# Patient Record
Sex: FEMALE | Race: ASIAN | NOT HISPANIC OR LATINO | URBAN - METROPOLITAN AREA
[De-identification: names, ages, dates, MRNs, and addresses within clinical notes are randomized per-mention and may not be internally consistent; named-entity substitution may affect disease eponyms.]

---

## 2018-08-18 ENCOUNTER — OUTPATIENT (OUTPATIENT)
Dept: OUTPATIENT SERVICES | Facility: HOSPITAL | Age: 38
LOS: 1 days | End: 2018-08-18
Payer: MEDICAID

## 2018-08-18 DIAGNOSIS — O26.899 OTHER SPECIFIED PREGNANCY RELATED CONDITIONS, UNSPECIFIED TRIMESTER: ICD-10-CM

## 2018-08-18 DIAGNOSIS — Z3A.00 WEEKS OF GESTATION OF PREGNANCY NOT SPECIFIED: ICD-10-CM

## 2018-08-18 PROCEDURE — 59025 FETAL NON-STRESS TEST: CPT | Mod: 26

## 2018-08-20 ENCOUNTER — INPATIENT (INPATIENT)
Facility: HOSPITAL | Age: 38
LOS: 2 days | Discharge: HOME | End: 2018-08-23
Attending: SPECIALIST | Admitting: SPECIALIST
Payer: MEDICAID

## 2018-08-20 VITALS
SYSTOLIC BLOOD PRESSURE: 116 MMHG | RESPIRATION RATE: 26 BRPM | DIASTOLIC BLOOD PRESSURE: 49 MMHG | HEART RATE: 117 BPM | OXYGEN SATURATION: 98 %

## 2018-08-20 DIAGNOSIS — O26.90 PREGNANCY RELATED CONDITIONS, UNSPECIFIED, UNSPECIFIED TRIMESTER: ICD-10-CM

## 2018-08-20 DIAGNOSIS — Z3A.00 WEEKS OF GESTATION OF PREGNANCY NOT SPECIFIED: ICD-10-CM

## 2018-08-20 LAB
BASOPHILS # BLD AUTO: 0.02 K/UL — SIGNIFICANT CHANGE UP (ref 0–0.2)
BASOPHILS NFR BLD AUTO: 0.3 % — SIGNIFICANT CHANGE UP (ref 0–2)
BLD GP AB SCN SERPL QL: NEGATIVE — SIGNIFICANT CHANGE UP
EOSINOPHIL # BLD AUTO: 0.15 K/UL — SIGNIFICANT CHANGE UP (ref 0–0.5)
EOSINOPHIL NFR BLD AUTO: 2.1 % — SIGNIFICANT CHANGE UP (ref 0–6)
HCT VFR BLD CALC: 27 % — LOW (ref 34.5–45)
HCT VFR BLD CALC: 35.4 % — SIGNIFICANT CHANGE UP (ref 34.5–45)
HGB BLD-MCNC: 10.5 G/DL — LOW (ref 11.5–15.5)
HGB BLD-MCNC: 8.1 G/DL — LOW (ref 11.5–15.5)
IMM GRANULOCYTES # BLD AUTO: 0.04 # — SIGNIFICANT CHANGE UP
IMM GRANULOCYTES NFR BLD AUTO: 0.6 % — SIGNIFICANT CHANGE UP (ref 0–1.5)
LYMPHOCYTES # BLD AUTO: 1.91 K/UL — SIGNIFICANT CHANGE UP (ref 1–3.3)
LYMPHOCYTES # BLD AUTO: 26.8 % — SIGNIFICANT CHANGE UP (ref 13–44)
MCHC RBC-ENTMCNC: 25.1 PG — LOW (ref 27–34)
MCHC RBC-ENTMCNC: 25.2 PG — LOW (ref 27–34)
MCHC RBC-ENTMCNC: 29.7 % — LOW (ref 32–36)
MCHC RBC-ENTMCNC: 30 % — LOW (ref 32–36)
MCV RBC AUTO: 83.6 FL — SIGNIFICANT CHANGE UP (ref 80–100)
MCV RBC AUTO: 85.1 FL — SIGNIFICANT CHANGE UP (ref 80–100)
MONOCYTES # BLD AUTO: 0.68 K/UL — SIGNIFICANT CHANGE UP (ref 0–0.9)
MONOCYTES NFR BLD AUTO: 9.6 % — SIGNIFICANT CHANGE UP (ref 2–14)
NEUTROPHILS # BLD AUTO: 4.32 K/UL — SIGNIFICANT CHANGE UP (ref 1.8–7.4)
NEUTROPHILS NFR BLD AUTO: 60.6 % — SIGNIFICANT CHANGE UP (ref 43–77)
NRBC # FLD: 0 — SIGNIFICANT CHANGE UP
NRBC # FLD: 0 — SIGNIFICANT CHANGE UP
PLATELET # BLD AUTO: 192 K/UL — SIGNIFICANT CHANGE UP (ref 150–400)
PLATELET # BLD AUTO: 211 K/UL — SIGNIFICANT CHANGE UP (ref 150–400)
PMV BLD: 11.7 FL — SIGNIFICANT CHANGE UP (ref 7–13)
PMV BLD: SIGNIFICANT CHANGE UP FL (ref 7–13)
RBC # BLD: 3.23 M/UL — LOW (ref 3.8–5.2)
RBC # BLD: 4.16 M/UL — SIGNIFICANT CHANGE UP (ref 3.8–5.2)
RBC # FLD: 22.1 % — HIGH (ref 10.3–14.5)
RBC # FLD: 22.7 % — HIGH (ref 10.3–14.5)
RH IG SCN BLD-IMP: POSITIVE — SIGNIFICANT CHANGE UP
RH IG SCN BLD-IMP: POSITIVE — SIGNIFICANT CHANGE UP
T PALLIDUM AB TITR SER: NEGATIVE — SIGNIFICANT CHANGE UP
WBC # BLD: 12.08 K/UL — HIGH (ref 3.8–10.5)
WBC # BLD: 7.12 K/UL — SIGNIFICANT CHANGE UP (ref 3.8–10.5)
WBC # FLD AUTO: 12.08 K/UL — HIGH (ref 3.8–10.5)
WBC # FLD AUTO: 7.12 K/UL — SIGNIFICANT CHANGE UP (ref 3.8–10.5)

## 2018-08-20 RX ORDER — INFLUENZA VIRUS VACCINE 15; 15; 15; 15 UG/.5ML; UG/.5ML; UG/.5ML; UG/.5ML
0.5 SUSPENSION INTRAMUSCULAR ONCE
Qty: 0 | Refills: 0 | Status: DISCONTINUED | OUTPATIENT
Start: 2018-08-20 | End: 2018-08-21

## 2018-08-20 RX ORDER — DIPHENHYDRAMINE HCL 50 MG
25 CAPSULE ORAL EVERY 6 HOURS
Qty: 0 | Refills: 0 | Status: DISCONTINUED | OUTPATIENT
Start: 2018-08-20 | End: 2018-08-23

## 2018-08-20 RX ORDER — FERROUS SULFATE 325(65) MG
325 TABLET ORAL
Qty: 0 | Refills: 0 | Status: DISCONTINUED | OUTPATIENT
Start: 2018-08-20 | End: 2018-08-21

## 2018-08-20 RX ORDER — METOCLOPRAMIDE HCL 10 MG
10 TABLET ORAL ONCE
Qty: 0 | Refills: 0 | Status: DISCONTINUED | OUTPATIENT
Start: 2018-08-20 | End: 2018-08-20

## 2018-08-20 RX ORDER — ACETAMINOPHEN 500 MG
1000 TABLET ORAL ONCE
Qty: 0 | Refills: 0 | Status: DISCONTINUED | OUTPATIENT
Start: 2018-08-20 | End: 2018-08-21

## 2018-08-20 RX ORDER — GLYCERIN ADULT
1 SUPPOSITORY, RECTAL RECTAL AT BEDTIME
Qty: 0 | Refills: 0 | Status: DISCONTINUED | OUTPATIENT
Start: 2018-08-20 | End: 2018-08-23

## 2018-08-20 RX ORDER — LANOLIN
1 OINTMENT (GRAM) TOPICAL
Qty: 0 | Refills: 0 | Status: DISCONTINUED | OUTPATIENT
Start: 2018-08-20 | End: 2018-08-23

## 2018-08-20 RX ORDER — SODIUM CHLORIDE 9 MG/ML
1000 INJECTION, SOLUTION INTRAVENOUS
Qty: 0 | Refills: 0 | Status: DISCONTINUED | OUTPATIENT
Start: 2018-08-20 | End: 2018-08-20

## 2018-08-20 RX ORDER — IBUPROFEN 200 MG
600 TABLET ORAL EVERY 6 HOURS
Qty: 0 | Refills: 0 | Status: DISCONTINUED | OUTPATIENT
Start: 2018-08-20 | End: 2018-08-21

## 2018-08-20 RX ORDER — HYDROMORPHONE HYDROCHLORIDE 2 MG/ML
30 INJECTION INTRAMUSCULAR; INTRAVENOUS; SUBCUTANEOUS
Qty: 0 | Refills: 0 | Status: DISCONTINUED | OUTPATIENT
Start: 2018-08-20 | End: 2018-08-21

## 2018-08-20 RX ORDER — ACETAMINOPHEN 500 MG
975 TABLET ORAL EVERY 6 HOURS
Qty: 0 | Refills: 0 | Status: DISCONTINUED | OUTPATIENT
Start: 2018-08-20 | End: 2018-08-21

## 2018-08-20 RX ORDER — HEPARIN SODIUM 5000 [USP'U]/ML
5000 INJECTION INTRAVENOUS; SUBCUTANEOUS EVERY 12 HOURS
Qty: 0 | Refills: 0 | Status: DISCONTINUED | OUTPATIENT
Start: 2018-08-20 | End: 2018-08-23

## 2018-08-20 RX ORDER — OXYTOCIN 10 UNIT/ML
333.33 VIAL (ML) INJECTION
Qty: 20 | Refills: 0 | Status: COMPLETED | OUTPATIENT
Start: 2018-08-20

## 2018-08-20 RX ORDER — SODIUM CHLORIDE 9 MG/ML
500 INJECTION, SOLUTION INTRAVENOUS ONCE
Qty: 0 | Refills: 0 | Status: COMPLETED | OUTPATIENT
Start: 2018-08-20 | End: 2018-08-20

## 2018-08-20 RX ORDER — SODIUM CHLORIDE 9 MG/ML
1000 INJECTION, SOLUTION INTRAVENOUS
Qty: 0 | Refills: 0 | Status: DISCONTINUED | OUTPATIENT
Start: 2018-08-20 | End: 2018-08-22

## 2018-08-20 RX ORDER — DOCUSATE SODIUM 100 MG
100 CAPSULE ORAL
Qty: 0 | Refills: 0 | Status: DISCONTINUED | OUTPATIENT
Start: 2018-08-20 | End: 2018-08-21

## 2018-08-20 RX ORDER — HYDROMORPHONE HYDROCHLORIDE 2 MG/ML
1 INJECTION INTRAMUSCULAR; INTRAVENOUS; SUBCUTANEOUS
Qty: 0 | Refills: 0 | Status: DISCONTINUED | OUTPATIENT
Start: 2018-08-20 | End: 2018-08-20

## 2018-08-20 RX ORDER — HYDROMORPHONE HYDROCHLORIDE 2 MG/ML
0.5 INJECTION INTRAMUSCULAR; INTRAVENOUS; SUBCUTANEOUS ONCE
Qty: 0 | Refills: 0 | Status: DISCONTINUED | OUTPATIENT
Start: 2018-08-20 | End: 2018-08-20

## 2018-08-20 RX ORDER — SODIUM CHLORIDE 9 MG/ML
1000 INJECTION, SOLUTION INTRAVENOUS ONCE
Qty: 0 | Refills: 0 | Status: COMPLETED | OUTPATIENT
Start: 2018-08-20 | End: 2018-08-20

## 2018-08-20 RX ORDER — OXYTOCIN 10 UNIT/ML
41.67 VIAL (ML) INJECTION
Qty: 20 | Refills: 0 | Status: DISCONTINUED | OUTPATIENT
Start: 2018-08-20 | End: 2018-08-21

## 2018-08-20 RX ORDER — SIMETHICONE 80 MG/1
80 TABLET, CHEWABLE ORAL
Qty: 0 | Refills: 0 | Status: DISCONTINUED | OUTPATIENT
Start: 2018-08-20 | End: 2018-08-23

## 2018-08-20 RX ORDER — OXYCODONE HYDROCHLORIDE 5 MG/1
5 TABLET ORAL EVERY 4 HOURS
Qty: 0 | Refills: 0 | Status: DISCONTINUED | OUTPATIENT
Start: 2018-08-20 | End: 2018-08-21

## 2018-08-20 RX ORDER — LEVOTHYROXINE SODIUM 125 MCG
75 TABLET ORAL DAILY
Qty: 0 | Refills: 0 | Status: DISCONTINUED | OUTPATIENT
Start: 2018-08-20 | End: 2018-08-23

## 2018-08-20 RX ORDER — ONDANSETRON 8 MG/1
4 TABLET, FILM COATED ORAL EVERY 6 HOURS
Qty: 0 | Refills: 0 | Status: DISCONTINUED | OUTPATIENT
Start: 2018-08-20 | End: 2018-08-21

## 2018-08-20 RX ORDER — NALOXONE HYDROCHLORIDE 4 MG/.1ML
0.1 SPRAY NASAL
Qty: 0 | Refills: 0 | Status: DISCONTINUED | OUTPATIENT
Start: 2018-08-20 | End: 2018-08-21

## 2018-08-20 RX ORDER — OXYCODONE HYDROCHLORIDE 5 MG/1
5 TABLET ORAL
Qty: 0 | Refills: 0 | Status: DISCONTINUED | OUTPATIENT
Start: 2018-08-20 | End: 2018-08-21

## 2018-08-20 RX ORDER — OXYTOCIN 10 UNIT/ML
333.33 VIAL (ML) INJECTION
Qty: 20 | Refills: 0 | Status: DISCONTINUED | OUTPATIENT
Start: 2018-08-20 | End: 2018-08-20

## 2018-08-20 RX ORDER — KETOROLAC TROMETHAMINE 30 MG/ML
30 SYRINGE (ML) INJECTION EVERY 6 HOURS
Qty: 0 | Refills: 0 | Status: DISCONTINUED | OUTPATIENT
Start: 2018-08-20 | End: 2018-08-21

## 2018-08-20 RX ORDER — HYDROMORPHONE HYDROCHLORIDE 2 MG/ML
0.5 INJECTION INTRAMUSCULAR; INTRAVENOUS; SUBCUTANEOUS
Qty: 0 | Refills: 0 | Status: DISCONTINUED | OUTPATIENT
Start: 2018-08-20 | End: 2018-08-21

## 2018-08-20 RX ORDER — FENTANYL CITRATE 50 UG/ML
50 INJECTION INTRAVENOUS ONCE
Qty: 0 | Refills: 0 | Status: DISCONTINUED | OUTPATIENT
Start: 2018-08-20 | End: 2018-08-20

## 2018-08-20 RX ORDER — FENTANYL CITRATE 50 UG/ML
100 INJECTION INTRAVENOUS ONCE
Qty: 0 | Refills: 0 | Status: DISCONTINUED | OUTPATIENT
Start: 2018-08-20 | End: 2018-08-20

## 2018-08-20 RX ORDER — TETANUS TOXOID, REDUCED DIPHTHERIA TOXOID AND ACELLULAR PERTUSSIS VACCINE, ADSORBED 5; 2.5; 8; 8; 2.5 [IU]/.5ML; [IU]/.5ML; UG/.5ML; UG/.5ML; UG/.5ML
0.5 SUSPENSION INTRAMUSCULAR ONCE
Qty: 0 | Refills: 0 | Status: DISCONTINUED | OUTPATIENT
Start: 2018-08-20 | End: 2018-08-23

## 2018-08-20 RX ORDER — HYDROMORPHONE HYDROCHLORIDE 2 MG/ML
0.5 INJECTION INTRAMUSCULAR; INTRAVENOUS; SUBCUTANEOUS
Qty: 0 | Refills: 0 | Status: DISCONTINUED | OUTPATIENT
Start: 2018-08-20 | End: 2018-08-20

## 2018-08-20 RX ORDER — LEVOTHYROXINE SODIUM 125 MCG
75 TABLET ORAL DAILY
Qty: 0 | Refills: 0 | Status: DISCONTINUED | OUTPATIENT
Start: 2018-08-20 | End: 2018-08-20

## 2018-08-20 RX ADMIN — HYDROMORPHONE HYDROCHLORIDE 30 MILLILITER(S): 2 INJECTION INTRAMUSCULAR; INTRAVENOUS; SUBCUTANEOUS at 12:52

## 2018-08-20 RX ADMIN — SODIUM CHLORIDE 75 MILLILITER(S): 9 INJECTION, SOLUTION INTRAVENOUS at 12:02

## 2018-08-20 RX ADMIN — HEPARIN SODIUM 5000 UNIT(S): 5000 INJECTION INTRAVENOUS; SUBCUTANEOUS at 16:36

## 2018-08-20 RX ADMIN — HYDROMORPHONE HYDROCHLORIDE 1 MILLIGRAM(S): 2 INJECTION INTRAMUSCULAR; INTRAVENOUS; SUBCUTANEOUS at 11:40

## 2018-08-20 RX ADMIN — Medication 30 MILLIGRAM(S): at 22:35

## 2018-08-20 RX ADMIN — SODIUM CHLORIDE 1000 MILLILITER(S): 9 INJECTION, SOLUTION INTRAVENOUS at 23:30

## 2018-08-20 RX ADMIN — HYDROMORPHONE HYDROCHLORIDE 30 MILLILITER(S): 2 INJECTION INTRAMUSCULAR; INTRAVENOUS; SUBCUTANEOUS at 18:55

## 2018-08-20 RX ADMIN — Medication 975 MILLIGRAM(S): at 20:15

## 2018-08-20 RX ADMIN — Medication 125 MILLIUNIT(S)/MIN: at 12:51

## 2018-08-20 RX ADMIN — Medication 30 MILLIGRAM(S): at 23:30

## 2018-08-20 RX ADMIN — HYDROMORPHONE HYDROCHLORIDE 0.5 MILLIGRAM(S): 2 INJECTION INTRAMUSCULAR; INTRAVENOUS; SUBCUTANEOUS at 08:47

## 2018-08-20 RX ADMIN — SODIUM CHLORIDE 1000 MILLILITER(S): 9 INJECTION, SOLUTION INTRAVENOUS at 19:30

## 2018-08-20 RX ADMIN — HYDROMORPHONE HYDROCHLORIDE 0.5 MILLIGRAM(S): 2 INJECTION INTRAMUSCULAR; INTRAVENOUS; SUBCUTANEOUS at 11:04

## 2018-08-20 RX ADMIN — Medication 30 MILLIGRAM(S): at 16:51

## 2018-08-20 RX ADMIN — Medication 30 MILLIGRAM(S): at 16:36

## 2018-08-20 RX ADMIN — HYDROMORPHONE HYDROCHLORIDE 30 MILLILITER(S): 2 INJECTION INTRAMUSCULAR; INTRAVENOUS; SUBCUTANEOUS at 19:25

## 2018-08-20 RX ADMIN — SODIUM CHLORIDE 2000 MILLILITER(S): 9 INJECTION, SOLUTION INTRAVENOUS at 12:00

## 2018-08-20 RX ADMIN — SODIUM CHLORIDE 75 MILLILITER(S): 9 INJECTION, SOLUTION INTRAVENOUS at 12:51

## 2018-08-20 NOTE — PROVIDER CONTACT NOTE (OTHER) - ASSESSMENT
Raza draining adequately. B/L clear lungs noted on assessment. Venodynes on pt.  Bleeding assessed as per protocol and WNL, afebrile, arousable, low

## 2018-08-20 NOTE — PROGRESS NOTE ADULT - SUBJECTIVE AND OBJECTIVE BOX
The patient, Ms. Fang, had fetal distress and was brought emergently to the OR before I was able to get consent for  Section.  A full verbal consent was obtained which explained GETA.  The patient understood that she was not a candidate for a neuraxial block 1) given both her history of BLE numbness, and pain and LLE weakness and 2) in the setting of an emergency.  General endotracheal anesthesia was performed without issues or complications. The patient and baby did well.

## 2018-08-20 NOTE — PROGRESS NOTE ADULT - SUBJECTIVE AND OBJECTIVE BOX
Patient is 37y  old.    Event : c/o dizzyness    Vital Signs Last 24 Hrs  T(C): 36.3 (20 Aug 2018 19:12), Max: 36.5 (20 Aug 2018 18:05)  T(F): 97.3 (20 Aug 2018 19:12), Max: 97.7 (20 Aug 2018 18:05)  HR: 58 (20 Aug 2018 19:12) (58 - 117)  BP: 89/53 (20 Aug 2018 19:12) (83/55 - 116/49)  BP(mean): 61 (20 Aug 2018 17:00) (49 - 73)  RR: 17 (20 Aug 2018 19:12) (12 - 30)  SpO2: 97% (20 Aug 2018 19:12) (94% - 100%)    I&O's Detail    20 Aug 2018 07:01  -  20 Aug 2018 20:58  --------------------------------------------------------  IN:    Lactated Ringers IV Bolus: 1000 mL    lactated ringers.: 300 mL    Other: 1800 mL    oxytocin Infusion: 625 mL  Total IN: 3725 mL    OUT:    Estimated Blood Loss: 700 mL    Indwelling Catheter - Urethral: 1410 mL  Total OUT: 2110 mL    Total NET: 1615 mL          Labs:                        8.1    12.08 )-----------( 192      ( 20 Aug 2018 20:15 )             27.0                         10.5   7.12  )-----------( 211      ( 20 Aug 2018 07:30 )             35.4             Fibrinogen:     Lactate:       Evaluation : s/p C/section POPD#0   c/o dizyness and headache, No SOB, CP or palpitation  Heart RRR  Lungs clear  Abdomen soft appropritly tender  Incision clean and dry  Moderate lochia  Foly draining good amount of clear urine  Positive orthostatic for HR    Management and Follow-up plan :  Stat CBC done 10.5/35.4 --> 8.1/27  Tylenol for HA  IV RL 500cc Bolus  Patient feel better after the bolus and Tylenol  Dr. Paniagua made aware of the above  CBC in Am  Will continue to monitor her closely            -------------------------------------------------------------------------------------------------------------

## 2018-08-21 LAB
BASOPHILS # BLD AUTO: 0.01 K/UL — SIGNIFICANT CHANGE UP (ref 0–0.2)
BASOPHILS # BLD AUTO: 0.02 K/UL — SIGNIFICANT CHANGE UP (ref 0–0.2)
BASOPHILS NFR BLD AUTO: 0.1 % — SIGNIFICANT CHANGE UP (ref 0–2)
BASOPHILS NFR BLD AUTO: 0.2 % — SIGNIFICANT CHANGE UP (ref 0–2)
EOSINOPHIL # BLD AUTO: 0 K/UL — SIGNIFICANT CHANGE UP (ref 0–0.5)
EOSINOPHIL # BLD AUTO: 0.06 K/UL — SIGNIFICANT CHANGE UP (ref 0–0.5)
EOSINOPHIL NFR BLD AUTO: 0 % — SIGNIFICANT CHANGE UP (ref 0–6)
EOSINOPHIL NFR BLD AUTO: 0.5 % — SIGNIFICANT CHANGE UP (ref 0–6)
HCT VFR BLD CALC: 23.6 % — LOW (ref 34.5–45)
HCT VFR BLD CALC: 35.2 % — SIGNIFICANT CHANGE UP (ref 34.5–45)
HGB BLD-MCNC: 11 G/DL — LOW (ref 11.5–15.5)
HGB BLD-MCNC: 7 G/DL — CRITICAL LOW (ref 11.5–15.5)
IMM GRANULOCYTES # BLD AUTO: 0.04 # — SIGNIFICANT CHANGE UP
IMM GRANULOCYTES # BLD AUTO: 0.06 # — SIGNIFICANT CHANGE UP
IMM GRANULOCYTES NFR BLD AUTO: 0.4 % — SIGNIFICANT CHANGE UP (ref 0–1.5)
IMM GRANULOCYTES NFR BLD AUTO: 0.5 % — SIGNIFICANT CHANGE UP (ref 0–1.5)
LACTATE SERPL-SCNC: 2.4 MMOL/L — HIGH (ref 0.5–2)
LYMPHOCYTES # BLD AUTO: 1.25 K/UL — SIGNIFICANT CHANGE UP (ref 1–3.3)
LYMPHOCYTES # BLD AUTO: 1.3 K/UL — SIGNIFICANT CHANGE UP (ref 1–3.3)
LYMPHOCYTES # BLD AUTO: 11.1 % — LOW (ref 13–44)
LYMPHOCYTES # BLD AUTO: 11.9 % — LOW (ref 13–44)
MCHC RBC-ENTMCNC: 24.6 PG — LOW (ref 27–34)
MCHC RBC-ENTMCNC: 25.3 PG — LOW (ref 27–34)
MCHC RBC-ENTMCNC: 29.7 % — LOW (ref 32–36)
MCHC RBC-ENTMCNC: 31.3 % — LOW (ref 32–36)
MCV RBC AUTO: 81.1 FL — SIGNIFICANT CHANGE UP (ref 80–100)
MCV RBC AUTO: 83.1 FL — SIGNIFICANT CHANGE UP (ref 80–100)
MONOCYTES # BLD AUTO: 0.7 K/UL — SIGNIFICANT CHANGE UP (ref 0–0.9)
MONOCYTES # BLD AUTO: 0.99 K/UL — HIGH (ref 0–0.9)
MONOCYTES NFR BLD AUTO: 6.2 % — SIGNIFICANT CHANGE UP (ref 2–14)
MONOCYTES NFR BLD AUTO: 9 % — SIGNIFICANT CHANGE UP (ref 2–14)
NEUTROPHILS # BLD AUTO: 8.59 K/UL — HIGH (ref 1.8–7.4)
NEUTROPHILS # BLD AUTO: 9.16 K/UL — HIGH (ref 1.8–7.4)
NEUTROPHILS NFR BLD AUTO: 78.5 % — HIGH (ref 43–77)
NEUTROPHILS NFR BLD AUTO: 81.6 % — HIGH (ref 43–77)
NRBC # FLD: 0 — SIGNIFICANT CHANGE UP
NRBC # FLD: 0 — SIGNIFICANT CHANGE UP
PLATELET # BLD AUTO: 165 K/UL — SIGNIFICANT CHANGE UP (ref 150–400)
PLATELET # BLD AUTO: 176 K/UL — SIGNIFICANT CHANGE UP (ref 150–400)
PMV BLD: SIGNIFICANT CHANGE UP FL (ref 7–13)
PMV BLD: SIGNIFICANT CHANGE UP FL (ref 7–13)
RBC # BLD: 2.84 M/UL — LOW (ref 3.8–5.2)
RBC # BLD: 4.34 M/UL — SIGNIFICANT CHANGE UP (ref 3.8–5.2)
RBC # FLD: 21.4 % — HIGH (ref 10.3–14.5)
RBC # FLD: 22 % — HIGH (ref 10.3–14.5)
WBC # BLD: 10.94 K/UL — HIGH (ref 3.8–10.5)
WBC # BLD: 11.24 K/UL — HIGH (ref 3.8–10.5)
WBC # FLD AUTO: 10.94 K/UL — HIGH (ref 3.8–10.5)
WBC # FLD AUTO: 11.24 K/UL — HIGH (ref 3.8–10.5)

## 2018-08-21 PROCEDURE — 93970 EXTREMITY STUDY: CPT | Mod: 26

## 2018-08-21 RX ORDER — OXYCODONE HYDROCHLORIDE 5 MG/1
5 TABLET ORAL
Qty: 0 | Refills: 0 | Status: DISCONTINUED | OUTPATIENT
Start: 2018-08-21 | End: 2018-08-21

## 2018-08-21 RX ORDER — ACETAMINOPHEN 500 MG
975 TABLET ORAL EVERY 6 HOURS
Qty: 0 | Refills: 0 | Status: DISCONTINUED | OUTPATIENT
Start: 2018-08-21 | End: 2018-08-23

## 2018-08-21 RX ORDER — IBUPROFEN 200 MG
600 TABLET ORAL EVERY 6 HOURS
Qty: 0 | Refills: 0 | Status: COMPLETED | OUTPATIENT
Start: 2018-08-21 | End: 2019-07-20

## 2018-08-21 RX ORDER — FERROUS SULFATE 325(65) MG
325 TABLET ORAL THREE TIMES A DAY
Qty: 0 | Refills: 0 | Status: DISCONTINUED | OUTPATIENT
Start: 2018-08-21 | End: 2018-08-23

## 2018-08-21 RX ORDER — IBUPROFEN 200 MG
600 TABLET ORAL EVERY 6 HOURS
Qty: 0 | Refills: 0 | Status: DISCONTINUED | OUTPATIENT
Start: 2018-08-21 | End: 2018-08-21

## 2018-08-21 RX ORDER — OXYCODONE HYDROCHLORIDE 5 MG/1
5 TABLET ORAL EVERY 4 HOURS
Qty: 0 | Refills: 0 | Status: DISCONTINUED | OUTPATIENT
Start: 2018-08-21 | End: 2018-08-23

## 2018-08-21 RX ORDER — SODIUM CHLORIDE 9 MG/ML
1000 INJECTION INTRAMUSCULAR; INTRAVENOUS; SUBCUTANEOUS
Qty: 0 | Refills: 0 | Status: DISCONTINUED | OUTPATIENT
Start: 2018-08-21 | End: 2018-08-21

## 2018-08-21 RX ORDER — IBUPROFEN 200 MG
600 TABLET ORAL EVERY 6 HOURS
Qty: 0 | Refills: 0 | Status: DISCONTINUED | OUTPATIENT
Start: 2018-08-21 | End: 2018-08-23

## 2018-08-21 RX ORDER — ACETAMINOPHEN 500 MG
975 TABLET ORAL ONCE
Qty: 0 | Refills: 0 | Status: COMPLETED | OUTPATIENT
Start: 2018-08-21 | End: 2018-08-21

## 2018-08-21 RX ORDER — OXYCODONE HYDROCHLORIDE 5 MG/1
5 TABLET ORAL
Qty: 0 | Refills: 0 | Status: DISCONTINUED | OUTPATIENT
Start: 2018-08-21 | End: 2018-08-23

## 2018-08-21 RX ORDER — ASCORBIC ACID 60 MG
500 TABLET,CHEWABLE ORAL DAILY
Qty: 0 | Refills: 0 | Status: DISCONTINUED | OUTPATIENT
Start: 2018-08-21 | End: 2018-08-23

## 2018-08-21 RX ORDER — OXYCODONE HYDROCHLORIDE 5 MG/1
5 TABLET ORAL EVERY 4 HOURS
Qty: 0 | Refills: 0 | Status: COMPLETED | OUTPATIENT
Start: 2018-08-21 | End: 2018-08-28

## 2018-08-21 RX ORDER — DOCUSATE SODIUM 100 MG
100 CAPSULE ORAL
Qty: 0 | Refills: 0 | Status: DISCONTINUED | OUTPATIENT
Start: 2018-08-21 | End: 2018-08-23

## 2018-08-21 RX ORDER — OXYCODONE HYDROCHLORIDE 5 MG/1
5 TABLET ORAL
Qty: 0 | Refills: 0 | Status: COMPLETED | OUTPATIENT
Start: 2018-08-21 | End: 2018-08-28

## 2018-08-21 RX ORDER — OXYCODONE HYDROCHLORIDE 5 MG/1
5 TABLET ORAL EVERY 4 HOURS
Qty: 0 | Refills: 0 | Status: DISCONTINUED | OUTPATIENT
Start: 2018-08-21 | End: 2018-08-21

## 2018-08-21 RX ORDER — DIPHENHYDRAMINE HCL 50 MG
25 CAPSULE ORAL ONCE
Qty: 0 | Refills: 0 | Status: COMPLETED | OUTPATIENT
Start: 2018-08-21 | End: 2018-08-21

## 2018-08-21 RX ADMIN — Medication 325 MILLIGRAM(S): at 13:33

## 2018-08-21 RX ADMIN — HEPARIN SODIUM 5000 UNIT(S): 5000 INJECTION INTRAVENOUS; SUBCUTANEOUS at 18:22

## 2018-08-21 RX ADMIN — Medication 100 MILLIGRAM(S): at 05:38

## 2018-08-21 RX ADMIN — Medication 75 MICROGRAM(S): at 05:38

## 2018-08-21 RX ADMIN — Medication 100 MILLIGRAM(S): at 18:22

## 2018-08-21 RX ADMIN — OXYCODONE HYDROCHLORIDE 5 MILLIGRAM(S): 5 TABLET ORAL at 17:20

## 2018-08-21 RX ADMIN — Medication 975 MILLIGRAM(S): at 02:50

## 2018-08-21 RX ADMIN — Medication 1 TABLET(S): at 13:33

## 2018-08-21 RX ADMIN — Medication 25 MILLIGRAM(S): at 15:22

## 2018-08-21 RX ADMIN — Medication 25 MILLIGRAM(S): at 09:22

## 2018-08-21 RX ADMIN — OXYCODONE HYDROCHLORIDE 5 MILLIGRAM(S): 5 TABLET ORAL at 14:20

## 2018-08-21 RX ADMIN — Medication 975 MILLIGRAM(S): at 22:16

## 2018-08-21 RX ADMIN — SIMETHICONE 80 MILLIGRAM(S): 80 TABLET, CHEWABLE ORAL at 18:22

## 2018-08-21 RX ADMIN — OXYCODONE HYDROCHLORIDE 5 MILLIGRAM(S): 5 TABLET ORAL at 16:29

## 2018-08-21 RX ADMIN — Medication 325 MILLIGRAM(S): at 05:38

## 2018-08-21 RX ADMIN — HYDROMORPHONE HYDROCHLORIDE 30 MILLILITER(S): 2 INJECTION INTRAMUSCULAR; INTRAVENOUS; SUBCUTANEOUS at 07:48

## 2018-08-21 RX ADMIN — Medication 30 MILLIGRAM(S): at 05:38

## 2018-08-21 RX ADMIN — OXYCODONE HYDROCHLORIDE 5 MILLIGRAM(S): 5 TABLET ORAL at 20:24

## 2018-08-21 RX ADMIN — HEPARIN SODIUM 5000 UNIT(S): 5000 INJECTION INTRAVENOUS; SUBCUTANEOUS at 05:38

## 2018-08-21 RX ADMIN — Medication 500 MILLIGRAM(S): at 13:33

## 2018-08-21 RX ADMIN — Medication 30 MILLIGRAM(S): at 06:30

## 2018-08-21 RX ADMIN — Medication 975 MILLIGRAM(S): at 23:00

## 2018-08-21 RX ADMIN — Medication 0.2 MILLIGRAM(S): at 17:59

## 2018-08-21 RX ADMIN — SIMETHICONE 80 MILLIGRAM(S): 80 TABLET, CHEWABLE ORAL at 13:33

## 2018-08-21 RX ADMIN — Medication 975 MILLIGRAM(S): at 16:16

## 2018-08-21 RX ADMIN — OXYCODONE HYDROCHLORIDE 5 MILLIGRAM(S): 5 TABLET ORAL at 13:33

## 2018-08-21 RX ADMIN — OXYCODONE HYDROCHLORIDE 5 MILLIGRAM(S): 5 TABLET ORAL at 21:14

## 2018-08-21 RX ADMIN — Medication 325 MILLIGRAM(S): at 22:15

## 2018-08-21 RX ADMIN — Medication 25 MILLIGRAM(S): at 02:50

## 2018-08-21 RX ADMIN — Medication 0.2 MILLIGRAM(S): at 22:15

## 2018-08-21 RX ADMIN — Medication 0.2 MILLIGRAM(S): at 13:33

## 2018-08-21 RX ADMIN — Medication 975 MILLIGRAM(S): at 15:18

## 2018-08-21 RX ADMIN — Medication 975 MILLIGRAM(S): at 09:23

## 2018-08-21 NOTE — PROGRESS NOTE ADULT - SUBJECTIVE AND OBJECTIVE BOX
OB Progress Note:  Delivery, POD#1    S: 36yo POD#1 s/p LTCS . Her pain is well controlled. She is tolerating a regular diet and passing flatus. Denies N/V. Denies CP/SOB. She was dizzy overnight and had positive orthostatics. CBC came back: ->723.6. FAST scan done overnight was negative. She is now getting 2 units of pRBC, 1st bag is hanging now. She feels well currently, states that she becomes lightheaded when she sits up which she has not done since last night. Raza is still in place.     O:   Vital Signs Last 24 Hrs  T(C): 36.4 (21 Aug 2018 05:46), Max: 36.6 (20 Aug 2018 22:02)  T(F): 97.5 (21 Aug 2018 05:46), Max: 97.9 (21 Aug 2018 04:27)  HR: 60 (21 Aug 2018 05:46) (58 - 117)  BP: 89/59 (21 Aug 2018 05:46) (83/45 - 116/49)  BP(mean): 61 (20 Aug 2018 17:00) (49 - 73)  RR: 17 (21 Aug 2018 05:46) (12 - 30)  SpO2: 100% (21 Aug 2018 05:46) (94% - 100%)    Labs:  Blood type: O Positive  Rubella IgG: RPR: Negative                          7.0<LL>   10.94<H> >-----------< 165    (  @ 01:00 )             23.6<L>                        8.1<L>   12.08<H> >-----------< 192    (  @ 20:15 )             27.0<L>                        10.5<L>   7.12 >-----------< 211    (  @ 07:30 )             35.4    PE:  General: NAD  Abdomen: Mildly distended, appropriately tender, incision c/d/i.  Extremities: No erythema, no pitting edema    A/P: 36yo POD#1 s/p LTCS with symptomatic anemia requiring blood transfusion.    - continue with blood tranfusion  -monitor BP  - Continue regular diet.  - Continue motrin, tylenol, oxycodone PRN for pain control.     Joanna Roberto PGY-1  Pager #: [phone] OB Progress Note:  Delivery, POD#1    S: 36yo POD#1 s/p LTCS . Her pain is well controlled. She is tolerating a regular diet and passing flatus. Denies N/V. Denies CP/SOB. She was dizzy overnight and had positive orthostatics. CBC came back: ->723.6. FAST scan done overnight was negative. She is now getting 2 units of pRBC, 1st bag is hanging now. She feels well currently, states that she becomes lightheaded when she sits up which she has not done since last night. Paulino is still in place.     O:   Vital Signs Last 24 Hrs  T(C): 36.4 (21 Aug 2018 05:46), Max: 36.6 (20 Aug 2018 22:02)  T(F): 97.5 (21 Aug 2018 05:46), Max: 97.9 (21 Aug 2018 04:27)  HR: 60 (21 Aug 2018 05:46) (58 - 117)  BP: 89/59 (21 Aug 2018 05:46) (83/45 - 116/49)  BP(mean): 61 (20 Aug 2018 17:00) (49 - 73)  RR: 17 (21 Aug 2018 05:46) (12 - 30)  SpO2: 100% (21 Aug 2018 05:46) (94% - 100%)    Labs:  Blood type: O Positive  Rubella IgG: RPR: Negative                          7.0<LL>   10.94<H> >-----------< 165    (  @ 01:00 )             23.6<L>                        8.1<L>   12.08<H> >-----------< 192    (  @ 20:15 )             27.0<L>                        10.5<L>   7.12 >-----------< 211    (  @ 07:30 )             35.4    PE:  General: NAD  Abdomen: Mildly distended, appropriately tender, incision c/d/i.  Extremities: No erythema, no pitting edema    A/P: 36yo POD#1 s/p LTCS with symptomatic anemia requiring blood transfusion.    - continue with blood tranfusion  -monitor BP  - Continue regular diet.  - c/w paulino  - f/u CBC after transfusion completed  - Continue motrin, tylenol, oxycodone PRN for pain control.     Joanna Roberto PGY-1  Pager #: [phone]

## 2018-08-21 NOTE — PROVIDER CONTACT NOTE (OTHER) - ACTION/TREATMENT ORDERED:
Continue pt on 2nd unit of blood, Lr at 125ml/hr, and keep paulino in until 4 hours CBC results. Metergine 0.2mg ordered, and a serum lactate to be drawn. Will continue to monitor the pt.

## 2018-08-21 NOTE — PROGRESS NOTE ADULT - SUBJECTIVE AND OBJECTIVE BOX
R4 OB Note    Patient seen and examined at bedside with Dr. Fajardo. Receiving second unit of pRBC. Dizziness is improving. No nausea, emesis, CP/SOB, palpitations. Has moderate pain, relieved with analgesics.     MEDICATIONS  (STANDING):  acetaminophen   Tablet 975 milliGRAM(s) Oral every 6 hours  acetaminophen   Tablet. 975 milliGRAM(s) Oral every 6 hours  diphtheria/tetanus/pertussis (acellular) Vaccine (ADAcel) 0.5 milliLiter(s) IntraMuscular once  docusate sodium 100 milliGRAM(s) Oral two times a day  ferrous    sulfate 325 milliGRAM(s) Oral two times a day  heparin  Injectable 5000 Unit(s) SubCutaneous every 12 hours  ibuprofen  Tablet 600 milliGRAM(s) Oral every 6 hours  ibuprofen  Tablet 600 milliGRAM(s) Oral every 6 hours  ketorolac   Injectable 30 milliGRAM(s) IV Push every 6 hours  lactated ringers. 1000 milliLiter(s) (125 mL/Hr) IV Continuous <Continuous>  levothyroxine 75 MICROGram(s) Oral daily  oxyCODONE    IR 5 milliGRAM(s) Oral every 3 hours  oxyCODONE    IR 5 milliGRAM(s) Oral every 3 hours  prenatal multivitamin 1 Tablet(s) Oral daily  simethicone 80 milliGRAM(s) Chew four times a day    MEDICATIONS  (PRN):  acetaminophen  IVPB. 1000 milliGRAM(s) IV Intermittent once PRN Mild Pain (1 - 3)  diphenhydrAMINE   Capsule 25 milliGRAM(s) Oral every 6 hours PRN Itching  glycerin Suppository - Adult 1 Suppository(s) Rectal at bedtime PRN Constipation  lanolin Ointment 1 Application(s) Topical every 3 hours PRN Sore Nipples  oxyCODONE    IR 5 milliGRAM(s) Oral every 4 hours PRN Severe Pain (7 - 10)  oxyCODONE    IR 5 milliGRAM(s) Oral every 4 hours PRN Severe Pain (7 - 10)      Allergies    No Known Allergies    Intolerances        12 point ROS negative except as outlined above    Vital Signs Last 24 Hrs  T(C): 36.5 (21 Aug 2018 10:15), Max: 36.6 (20 Aug 2018 22:02)  T(F): 97.7 (21 Aug 2018 10:15), Max: 97.9 (21 Aug 2018 04:27)  HR: 70 (21 Aug 2018 10:15) (58 - 101)  BP: 95/58 (21 Aug 2018 10:15) (83/38 - 104/66)  BP(mean): 61 (20 Aug 2018 17:00) (49 - 73)  RR: 18 (21 Aug 2018 10:15) (12 - 22)  SpO2: 100% (21 Aug 2018 10:15) (94% - 100%)    Last Menstrual Period      PHYSICAL EXAM:    GA: NAD, A+0 x 3  CV: RRR  Pulm: CTA BL  Abd:  soft, mildly tender, moderate distended, no rebound or guarding,   Incision: clean, dry and intact; steri-strips in place  Uterus: Fundus midline; firm at  umbilicus  : lochia WNL;   Extremities: no swelling or calf tenderness,           LABS:             7.0    10.94 )-----------( 165      ( 08-21 @ 01:00 )             23.6                8.1    12.08 )-----------( 192      ( 08-20 @ 20:15 )             27.0                10.5   7.12  )-----------( 211      ( 08-20 @ 07:30 )             35.4                   FAST: Small fluid in LUQ, none in RUQ, none in pouch of liz

## 2018-08-21 NOTE — PROVIDER CONTACT NOTE (OTHER) - RECOMMENDATIONS
Fluids encouraged, d/c PCA hydromorphone 1mg. At 1025, Dr. Miller and Dr. Fajardo at the bedside evaluating the pt. Fast scan done by Dr. Fajardo, negative results as per MD.

## 2018-08-21 NOTE — PROVIDER CONTACT NOTE (OTHER) - ASSESSMENT
See flow sheet for vitals. Lungs clear to auscultation, fundus firm and at umbilicus, + bowel sounds, Raza output adequate, moderate lochia, venodyne machine on pt, and incentive spirometer at the bedside. Abdomen soft, incision covered with derma bond and d/c/i., no calf tenderness at this time as per pt.

## 2018-08-21 NOTE — PROGRESS NOTE ADULT - SUBJECTIVE AND OBJECTIVE BOX
Postop Day  __1_ s/p   C- Section    THERAPY:  [  ] Spinal morphine   [  ] Epidural morphine   [ x ] IV PCA Hydromorphone 1 mg/ml    T(C): 36.4 (08-21-18 @ 05:46), Max: 36.6 (08-21-18 @ 04:27)  HR: 60 (08-21-18 @ 05:46) (58 - 61)  BP: 89/59 (08-21-18 @ 05:46) (83/45 - 89/59)  RR: 17 (08-21-18 @ 05:46) (16 - 17)  SpO2: 100% (08-21-18 @ 05:46) (100% - 100%)    MEDICATIONS  (STANDING):  acetaminophen   Tablet 975 milliGRAM(s) Oral every 6 hours  diphtheria/tetanus/pertussis (acellular) Vaccine (ADAcel) 0.5 milliLiter(s) IntraMuscular once  docusate sodium 100 milliGRAM(s) Oral two times a day  ferrous    sulfate 325 milliGRAM(s) Oral two times a day  heparin  Injectable 5000 Unit(s) SubCutaneous every 12 hours  HYDROmorphone PCA (1 mG/mL) 30 milliLiter(s) PCA Continuous PCA Continuous  ibuprofen  Tablet 600 milliGRAM(s) Oral every 6 hours  ketorolac   Injectable 30 milliGRAM(s) IV Push every 6 hours  lactated ringers. 1000 milliLiter(s) (125 mL/Hr) IV Continuous <Continuous>  levothyroxine 75 MICROGram(s) Oral daily  oxyCODONE    IR 5 milliGRAM(s) Oral every 3 hours  prenatal multivitamin 1 Tablet(s) Oral daily  simethicone 80 milliGRAM(s) Chew four times a day    MEDICATIONS  (PRN):  acetaminophen  IVPB. 1000 milliGRAM(s) IV Intermittent once PRN Mild Pain (1 - 3)  diphenhydrAMINE   Capsule 25 milliGRAM(s) Oral every 6 hours PRN Itching  glycerin Suppository - Adult 1 Suppository(s) Rectal at bedtime PRN Constipation  HYDROmorphone PCA (1 mG/mL) Rescue Clinician Bolus 0.5 milliGRAM(s) IV Push every 15 minutes PRN for Pain Scale GREATER THAN 6  lanolin Ointment 1 Application(s) Topical every 3 hours PRN Sore Nipples  naloxone Injectable 0.1 milliGRAM(s) IV Push every 3 minutes PRN For ANY of the following changes in patient status:  A. RR LESS THAN 10 breaths per minute, B. Oxygen saturation LESS THAN 90%, C. Sedation score of 6  ondansetron Injectable 4 milliGRAM(s) IV Push every 6 hours PRN Nausea  oxyCODONE    IR 5 milliGRAM(s) Oral every 4 hours PRN Severe Pain (7 - 10)      Pain:   _____mild______ at rest;  _____moderate_____with activity    Total since start : 3.6mg Dilaudid    Sedation Score:	  [ x ] Alert	    [  ] Drowsy        [  ] Arousable	[  ] Asleep	[  ] Unresponsive    Side Effects:	  [  ] None	     [  ] Nausea        [ x ] Pruritus        [  ] Weakness   [  ] Numbness        ASSESSMENT/ PLAN  [   ] Side effects resolving      [   ] Patient made aware of PRN meds available     [ x] Discontinue & switch to PRN pain medications        [  ] Continue       Patient states lower extremities feel and move normally. No apparent anesthetic complications.

## 2018-08-21 NOTE — PROGRESS NOTE ADULT - SUBJECTIVE AND OBJECTIVE BOX
ANESTHESIA POSTOP CHECK    37y Female POSTOP DAY 1     Vital Signs Last 24 Hrs  T(C): 36.4 (21 Aug 2018 05:46), Max: 36.6 (20 Aug 2018 22:02)  T(F): 97.5 (21 Aug 2018 05:46), Max: 97.9 (21 Aug 2018 04:27)  HR: 60 (21 Aug 2018 05:46) (58 - 117)  BP: 89/59 (21 Aug 2018 05:46) (83/45 - 116/49)  BP(mean): 61 (20 Aug 2018 17:00) (49 - 73)  RR: 17 (21 Aug 2018 05:46) (12 - 30)  SpO2: 100% (21 Aug 2018 05:46) (94% - 100%)  I&O's Summary    20 Aug 2018 07:01  -  21 Aug 2018 07:00  --------------------------------------------------------  IN: 4225 mL / OUT: 4135 mL / NET: 90 mL        NO APPARENT ANESTHESIA COMPLICATIONS

## 2018-08-21 NOTE — PROGRESS NOTE ADULT - SUBJECTIVE AND OBJECTIVE BOX
OB Event Note    S: 36yo POD#1 s/p LTCS. Called to patients bedside for a BP of 83/38.    Patient s/p pLTCS, , Hct was found to have dropped from 35 to 27 to 23. Patient ordered for 2u PRBCs. Now s/p the first unit and s/p bolus  HR 73, and patient complaining of dizziness.    Her pain is well controlled. She is tolerating a regular diet and passing flatus. Denies N/V. Denies CP/SOB/lightheadedness/dizziness.   She is ambulating without difficulty.   Voiding spontanously.     O:   Vital Signs Last 24 Hrs  T(C): 36.5 (21 Aug 2018 10:15), Max: 36.6 (20 Aug 2018 22:02)  T(F): 97.7 (21 Aug 2018 10:15), Max: 97.9 (21 Aug 2018 04:27)  HR: 70 (21 Aug 2018 10:15) (58 - 90)  BP: 95/58 (21 Aug 2018 10:15) (83/38 - 99/54)  BP(mean): 61 (20 Aug 2018 17:00) (49 - 69)  RR: 18 (21 Aug 2018 10:15) (12 - 22)  SpO2: 100% (21 Aug 2018 10:15) (94% - 100%)    Labs:  Blood type: O Positive  Rubella IgG: RPR: Negative                          7.0<LL>   10.94<H> >-----------< 165    ( 08-21 @ 01:00 )             23.6<L>                        8.1<L>   12.08<H> >-----------< 192    ( 08-20 @ 20:15 )             27.0<L>                        10.5<L>   7.12 >-----------< 211    ( 08-20 @ 07:30 )             35.4                  PE:  General: NAD  Abdomen: Mildly distended, appropriately tender, incision c/d/i.  Extremities: No erythema, no pitting edema    A/P: 36yo POD#1 s/p LTCS.  Patient is stable and doing well post-operatively.    - Continue regular diet.  - Increase ambulation.  - Continue motrin, tylenol, oxycodone PRN for pain control.  vs. continue PCEA for pain.  - F/u AM CBC    Shama Otero, PGY1  Pager #[phone]

## 2018-08-21 NOTE — CHART NOTE - NSCHARTNOTEFT_GEN_A_CORE
R1 note    Pt evaluated at bedside for repeated evaluation POD1 pLTCS under General Anesthesia. Earlier in the evening, patient was given two bolus of fluid. Pt still endorses feeling dizzy and lightheaded.     T(C): 36.5 (08-21-18 @ 01:52), Max: 36.6 (08-20-18 @ 22:02)  HR: 66 (08-21-18 @ 01:52) (58 - 117)  BP: 88/51 (08-21-18 @ 01:52) (83/55 - 116/49)  RR: 17 (08-21-18 @ 01:52) (12 - 30)  SpO2: 99% (08-21-18 @ 01:52) (94% - 100%)    Orthostatics +    H/H  10/35->8/27->7/23.6      PE  CV: RRR  Fast scan negative      Plan  - Pt to receive 2 units of blood  - Will continue to monitor VS and trend H/H    SAMPSON Aguilar PGY1  w/ Dr. Lou PGY4 and Dr. Serrano  at bedside.

## 2018-08-22 LAB
BASOPHILS # BLD AUTO: 0.02 K/UL — SIGNIFICANT CHANGE UP (ref 0–0.2)
BASOPHILS NFR BLD AUTO: 0.2 % — SIGNIFICANT CHANGE UP (ref 0–2)
EOSINOPHIL # BLD AUTO: 0.01 K/UL — SIGNIFICANT CHANGE UP (ref 0–0.5)
EOSINOPHIL NFR BLD AUTO: 0.1 % — SIGNIFICANT CHANGE UP (ref 0–6)
HCT VFR BLD CALC: 35.4 % — SIGNIFICANT CHANGE UP (ref 34.5–45)
HGB BLD-MCNC: 11.1 G/DL — LOW (ref 11.5–15.5)
IMM GRANULOCYTES # BLD AUTO: 0.1 # — SIGNIFICANT CHANGE UP
IMM GRANULOCYTES NFR BLD AUTO: 0.8 % — SIGNIFICANT CHANGE UP (ref 0–1.5)
LACTATE SERPL-SCNC: 1.5 MMOL/L — SIGNIFICANT CHANGE UP (ref 0.5–2)
LACTATE SERPL-SCNC: 2 MMOL/L — SIGNIFICANT CHANGE UP (ref 0.5–2)
LYMPHOCYTES # BLD AUTO: 1.5 K/UL — SIGNIFICANT CHANGE UP (ref 1–3.3)
LYMPHOCYTES # BLD AUTO: 11.8 % — LOW (ref 13–44)
MCHC RBC-ENTMCNC: 25.8 PG — LOW (ref 27–34)
MCHC RBC-ENTMCNC: 31.4 % — LOW (ref 32–36)
MCV RBC AUTO: 82.1 FL — SIGNIFICANT CHANGE UP (ref 80–100)
MONOCYTES # BLD AUTO: 0.91 K/UL — HIGH (ref 0–0.9)
MONOCYTES NFR BLD AUTO: 7.2 % — SIGNIFICANT CHANGE UP (ref 2–14)
NEUTROPHILS # BLD AUTO: 10.18 K/UL — HIGH (ref 1.8–7.4)
NEUTROPHILS NFR BLD AUTO: 79.9 % — HIGH (ref 43–77)
NRBC # FLD: 0 — SIGNIFICANT CHANGE UP
PLATELET # BLD AUTO: 177 K/UL — SIGNIFICANT CHANGE UP (ref 150–400)
PMV BLD: 11.6 FL — SIGNIFICANT CHANGE UP (ref 7–13)
RBC # BLD: 4.31 M/UL — SIGNIFICANT CHANGE UP (ref 3.8–5.2)
RBC # FLD: 21.8 % — HIGH (ref 10.3–14.5)
WBC # BLD: 12.72 K/UL — HIGH (ref 3.8–10.5)
WBC # FLD AUTO: 12.72 K/UL — HIGH (ref 3.8–10.5)

## 2018-08-22 RX ADMIN — Medication 975 MILLIGRAM(S): at 04:28

## 2018-08-22 RX ADMIN — Medication 975 MILLIGRAM(S): at 10:20

## 2018-08-22 RX ADMIN — Medication 600 MILLIGRAM(S): at 10:20

## 2018-08-22 RX ADMIN — Medication 100 MILLIGRAM(S): at 05:44

## 2018-08-22 RX ADMIN — Medication 975 MILLIGRAM(S): at 17:10

## 2018-08-22 RX ADMIN — HEPARIN SODIUM 5000 UNIT(S): 5000 INJECTION INTRAVENOUS; SUBCUTANEOUS at 16:10

## 2018-08-22 RX ADMIN — HEPARIN SODIUM 5000 UNIT(S): 5000 INJECTION INTRAVENOUS; SUBCUTANEOUS at 05:44

## 2018-08-22 RX ADMIN — OXYCODONE HYDROCHLORIDE 5 MILLIGRAM(S): 5 TABLET ORAL at 05:45

## 2018-08-22 RX ADMIN — Medication 600 MILLIGRAM(S): at 16:10

## 2018-08-22 RX ADMIN — Medication 975 MILLIGRAM(S): at 16:10

## 2018-08-22 RX ADMIN — Medication 975 MILLIGRAM(S): at 11:20

## 2018-08-22 RX ADMIN — Medication 75 MICROGRAM(S): at 05:45

## 2018-08-22 RX ADMIN — Medication 975 MILLIGRAM(S): at 22:50

## 2018-08-22 RX ADMIN — Medication 600 MILLIGRAM(S): at 17:10

## 2018-08-22 RX ADMIN — Medication 100 MILLIGRAM(S): at 16:22

## 2018-08-22 RX ADMIN — Medication 600 MILLIGRAM(S): at 03:10

## 2018-08-22 RX ADMIN — SIMETHICONE 80 MILLIGRAM(S): 80 TABLET, CHEWABLE ORAL at 05:46

## 2018-08-22 RX ADMIN — Medication 975 MILLIGRAM(S): at 23:30

## 2018-08-22 RX ADMIN — Medication 600 MILLIGRAM(S): at 23:30

## 2018-08-22 RX ADMIN — Medication 600 MILLIGRAM(S): at 22:50

## 2018-08-22 RX ADMIN — OXYCODONE HYDROCHLORIDE 5 MILLIGRAM(S): 5 TABLET ORAL at 02:44

## 2018-08-22 RX ADMIN — Medication 975 MILLIGRAM(S): at 05:00

## 2018-08-22 RX ADMIN — Medication 600 MILLIGRAM(S): at 11:20

## 2018-08-22 RX ADMIN — Medication 0.2 MILLIGRAM(S): at 10:23

## 2018-08-22 RX ADMIN — Medication 0.2 MILLIGRAM(S): at 02:32

## 2018-08-22 RX ADMIN — Medication 0.2 MILLIGRAM(S): at 05:45

## 2018-08-22 NOTE — PROGRESS NOTE ADULT - SUBJECTIVE AND OBJECTIVE BOX
OB Progress Note:  Delivery, POD#2    S: 38yo POD#2 s/p LTCS . Her pain is well controlled. She is tolerating a regular diet and passing flatus. Denies N/V. Denies CP/SOB/lightheadedness/dizziness. She is ambulating without difficulty. Voiding spontanously. She is complaining of increased lower back pain this morning. She is feeling better s/p 2 units of PRBC.    O:   Vital Signs Last 24 Hrs  T(C): 36.8 (22 Aug 2018 07:09), Max: 36.8 (21 Aug 2018 21:49)  T(F): 98.2 (22 Aug 2018 07:09), Max: 98.3 (21 Aug 2018 21:49)  HR: 50 (22 Aug 2018 07:09) (50 - 73)  BP: 100/62 (22 Aug 2018 07:09) (83/38 - 115/60)  BP(mean): --  RR: 18 (22 Aug 2018 07:09) (16 - 18)  SpO2: 97% (22 Aug 2018 07:09) (97% - 100%)    Labs:  Blood type: O Positive  Rubella IgG: RPR: Negative                          11.0<L>   11.24<H> >-----------< 176    (  @ 17:00 )             35.2                        7.0<LL>   10.94<H> >-----------< 165    (  @ 01:00 )             23.6<L>                        8.1<L>   12.08<H> >-----------< 192    (  @ 20:15 )             27.0<L>                        10.5<L>   7.12 >-----------< 211    (  @ 07:30 )             35.4    PE:  General: NAD  Abdomen: Mildly distended, appropriately tender, incision c/d/i.  Extremities: No erythema, no pitting edema  MSK: Symmetric tenderness of lower back, paraspinal. No erythema or rash on the back.    A/P: 38yo POD#2 s/p LTCS.  Patient is stable and doing well post-operatively.    - Continue regular diet.  - Increase ambulation.  - Continue motrin, tylenol, oxycodone PRN for pain control. Will reassess response to pain medication later today  - F/u AM OMID Roberto PGY-1  Pager #: [phone]

## 2018-08-22 NOTE — PROGRESS NOTE ADULT - SUBJECTIVE AND OBJECTIVE BOX
Postpartum Note,  Section  She is a  37y woman who is now post-operative day: 2    Subjective:  Pt states her pain is not well controlled with oxycodone.  Requesting a different medication.  Is passing   flatus .  She is ambulating.   She is tolerating regular diet.  She denies nausea and vomiting.  She is voiding.  She reports normal postpartum bleeding.  She is formula feeding.    Physical exam:    Vital Signs Last 24 Hrs  T(C): 36.5 (22 Aug 2018 02:19), Max: 36.8 (21 Aug 2018 21:49)  T(F): 97.7 (22 Aug 2018 02:19), Max: 98.3 (21 Aug 2018 21:49)  HR: 56 (22 Aug 2018 02:19) (56 - 73)  BP: 106/61 (22 Aug 2018 02:19) (83/38 - 115/60)  BP(mean): --  RR: 18 (22 Aug 2018 02:19) (16 - 18)  SpO2: 100% (22 Aug 2018 02:19) (100% - 100%)    Gen: NAD  Breast: Soft, nontender, not engorged.  Abdomen: Soft, nontender, slight distension , firm uterine fundus at umbilicus.  Incision: Clean, dry, and intact with steri strips  Pelvic: Normal lochia noted  Ext: No calf tenderness    LABS:                        11.0   11.24 )-----------( 176      ( 21 Aug 2018 17:00 )             35.2       Rubella status:     Allergies    No Known Allergies    Intolerances      MEDICATIONS  (STANDING):  acetaminophen   Tablet. 975 milliGRAM(s) Oral every 6 hours  ascorbic acid 500 milliGRAM(s) Oral daily  diphtheria/tetanus/pertussis (acellular) Vaccine (ADAcel) 0.5 milliLiter(s) IntraMuscular once  docusate sodium 100 milliGRAM(s) Oral two times a day  ferrous    sulfate 325 milliGRAM(s) Oral three times a day  heparin  Injectable 5000 Unit(s) SubCutaneous every 12 hours  ibuprofen  Tablet 600 milliGRAM(s) Oral every 6 hours  lactated ringers. 1000 milliLiter(s) (125 mL/Hr) IV Continuous <Continuous>  levothyroxine 75 MICROGram(s) Oral daily  methylergonovine 0.2 milliGRAM(s) Oral every 4 hours  oxyCODONE    IR 5 milliGRAM(s) Oral every 3 hours  prenatal multivitamin 1 Tablet(s) Oral daily  simethicone 80 milliGRAM(s) Chew four times a day    MEDICATIONS  (PRN):  diphenhydrAMINE   Capsule 25 milliGRAM(s) Oral every 6 hours PRN Itching  glycerin Suppository - Adult 1 Suppository(s) Rectal at bedtime PRN Constipation  lanolin Ointment 1 Application(s) Topical every 3 hours PRN Sore Nipples  oxyCODONE    IR 5 milliGRAM(s) Oral every 4 hours PRN Severe Pain (7 - 10)        Assessment and Plan  POD #2   s/p  section.  Pain management not optimal  elevated lactic acid  P:Motrin 600mg now, Repeat lactic acid at 12     Encourage ambulation.      Continue routine post op care. Postpartum Note,  Section  She is a  37y woman who is now post-operative day: 2    Subjective:  Pt states her pain is not well controlled with oxycodone.  Requesting a different medication.  Is passing   flatus .  She is ambulating.   She is tolerating regular diet.  She denies nausea and vomiting.  She is voiding.  She reports normal postpartum bleeding.  She is formula feeding.    Physical exam:    Vital Signs Last 24 Hrs  T(C): 36.5 (22 Aug 2018 02:19), Max: 36.8 (21 Aug 2018 21:49)  T(F): 97.7 (22 Aug 2018 02:19), Max: 98.3 (21 Aug 2018 21:49)  HR: 56 (22 Aug 2018 02:19) (56 - 73)  BP: 106/61 (22 Aug 2018 02:19) (83/38 - 115/60)  BP(mean): --  RR: 18 (22 Aug 2018 02:19) (16 - 18)  SpO2: 100% (22 Aug 2018 02:19) (100% - 100%)    Gen: NAD  Breast: Soft, nontender, not engorged.  Abdomen: Soft, nontender, slight distension , firm uterine fundus at umbilicus.  Incision: Clean, dry, and intact with steri strips  Pelvic: Normal lochia noted  Ext: No calf tenderness    LABS:                        11.0   11.24 )-----------( 176      ( 21 Aug 2018 17:00 )             35.2       Rubella status:     Allergies    No Known Allergies    Intolerances      MEDICATIONS  (STANDING):  acetaminophen   Tablet. 975 milliGRAM(s) Oral every 6 hours  ascorbic acid 500 milliGRAM(s) Oral daily  diphtheria/tetanus/pertussis (acellular) Vaccine (ADAcel) 0.5 milliLiter(s) IntraMuscular once  docusate sodium 100 milliGRAM(s) Oral two times a day  ferrous    sulfate 325 milliGRAM(s) Oral three times a day  heparin  Injectable 5000 Unit(s) SubCutaneous every 12 hours  ibuprofen  Tablet 600 milliGRAM(s) Oral every 6 hours  lactated ringers. 1000 milliLiter(s) (125 mL/Hr) IV Continuous <Continuous>  levothyroxine 75 MICROGram(s) Oral daily  methylergonovine 0.2 milliGRAM(s) Oral every 4 hours  oxyCODONE    IR 5 milliGRAM(s) Oral every 3 hours  prenatal multivitamin 1 Tablet(s) Oral daily  simethicone 80 milliGRAM(s) Chew four times a day    MEDICATIONS  (PRN):  diphenhydrAMINE   Capsule 25 milliGRAM(s) Oral every 6 hours PRN Itching  glycerin Suppository - Adult 1 Suppository(s) Rectal at bedtime PRN Constipation  lanolin Ointment 1 Application(s) Topical every 3 hours PRN Sore Nipples  oxyCODONE    IR 5 milliGRAM(s) Oral every 4 hours PRN Severe Pain (7 - 10)        Assessment and Plan  POD #2   s/p  section.  Pain management not optimal  elevated lactate  P:Motrin 600mg now, Repeat lactate at 12     Encourage ambulation.     Continue routine post op care.

## 2018-08-23 VITALS
TEMPERATURE: 98 F | HEART RATE: 57 BPM | DIASTOLIC BLOOD PRESSURE: 60 MMHG | OXYGEN SATURATION: 98 % | SYSTOLIC BLOOD PRESSURE: 99 MMHG | RESPIRATION RATE: 16 BRPM

## 2018-08-23 RX ORDER — LEVOTHYROXINE SODIUM 125 MCG
1 TABLET ORAL
Qty: 0 | Refills: 0 | COMMUNITY

## 2018-08-23 RX ORDER — IBUPROFEN 200 MG
1 TABLET ORAL
Qty: 0 | Refills: 0 | COMMUNITY
Start: 2018-08-23

## 2018-08-23 RX ORDER — LEVOTHYROXINE SODIUM 125 MCG
1 TABLET ORAL
Qty: 0 | Refills: 0 | COMMUNITY
Start: 2018-08-23

## 2018-08-23 RX ORDER — ACETAMINOPHEN 500 MG
3 TABLET ORAL
Qty: 0 | Refills: 0 | COMMUNITY
Start: 2018-08-23

## 2018-08-23 RX ADMIN — Medication 600 MILLIGRAM(S): at 06:40

## 2018-08-23 RX ADMIN — HEPARIN SODIUM 5000 UNIT(S): 5000 INJECTION INTRAVENOUS; SUBCUTANEOUS at 06:40

## 2018-08-23 RX ADMIN — Medication 975 MILLIGRAM(S): at 12:14

## 2018-08-23 RX ADMIN — Medication 600 MILLIGRAM(S): at 12:14

## 2018-08-23 RX ADMIN — Medication 975 MILLIGRAM(S): at 07:15

## 2018-08-23 RX ADMIN — Medication 600 MILLIGRAM(S): at 07:15

## 2018-08-23 RX ADMIN — Medication 975 MILLIGRAM(S): at 06:40

## 2018-08-23 RX ADMIN — Medication 100 MILLIGRAM(S): at 06:40

## 2018-08-23 RX ADMIN — Medication 75 MICROGRAM(S): at 06:40

## 2018-08-23 NOTE — DISCHARGE NOTE OB - MEDICATION SUMMARY - MEDICATIONS TO TAKE
I will START or STAY ON the medications listed below when I get home from the hospital:    ibuprofen 600 mg oral tablet  -- 1 tab(s) by mouth every 6 hours, As Needed  -- Indication: For for pain     acetaminophen 325 mg oral tablet  -- 3 tab(s) by mouth every 6 hours, As Needed  -- Indication: For  delivery delivered    levothyroxine 75 mcg (0.075 mg) oral tablet  -- 1 tab(s) by mouth once a day  -- Indication: For  delivery delivered

## 2018-08-23 NOTE — DISCHARGE NOTE OB - PATIENT PORTAL LINK FT
You can access the [facility] Patient Portal, offered by [facility], by registering with the following website: [email or web address]

## 2018-08-23 NOTE — DISCHARGE NOTE OB - CARE PROVIDER_API CALL
Kayla Cason), Obstetrics and Gynecology  [address] [place] [address]  Phone: [phone]  Fax: [phone]

## 2018-08-23 NOTE — DISCHARGE NOTE OB - CARE PLAN
Principal Discharge DX:	 delivery delivered  Goal:	full recovery  Assessment and plan of treatment:	follow up in 2 weeks/ regular diet & activity as tolerate

## 2018-08-23 NOTE — PROGRESS NOTE ADULT - SUBJECTIVE AND OBJECTIVE BOX
Postpartum Note,  Section  She is a  37y woman who is now post-operative day:     Subjective:  The patient feels well.  She is ambulating.   She is tolerating regular diet.  She denies nausea and vomiting.  She is voiding.  Her pain is controlled.  She reports normal postpartum bleeding.    Vital Signs Last 24 Hrs  T(C): 36.7 (23 Aug 2018 06:12), Max: 37 (22 Aug 2018 14:25)  T(F): 98.1 (23 Aug 2018 06:12), Max: 98.6 (22 Aug 2018 14:25)  HR: 57 (23 Aug 2018 06:12) (55 - 61)  BP: 99/60 (23 Aug 2018 06:12) (96/57 - 99/60)  BP(mean): --  RR: 16 (23 Aug 2018 06:12) (16 - 18)  SpO2: 98% (23 Aug 2018 06:12) (98% - 100%)    Physical exam:  Gen: NAD  Breast: Soft, nontender, not engorged.  Abdomen: Soft, nontender, no distension , firm uterine fundus at umbilicus.  Incision: Clean, dry, and intact with steri strips  Pelvic: Normal lochia noted  Ext: No calf tenderness    LABS:                        11.1   12.72 )-----------( 177      ( 22 Aug 2018 13:15 )             35.4         Allergies    No Known Allergies    Intolerances      MEDICATIONS  (STANDING):  acetaminophen   Tablet. 975 milliGRAM(s) Oral every 6 hours  ascorbic acid 500 milliGRAM(s) Oral daily  diphtheria/tetanus/pertussis (acellular) Vaccine (ADAcel) 0.5 milliLiter(s) IntraMuscular once  docusate sodium 100 milliGRAM(s) Oral two times a day  ferrous    sulfate 325 milliGRAM(s) Oral three times a day  heparin  Injectable 5000 Unit(s) SubCutaneous every 12 hours  ibuprofen  Tablet 600 milliGRAM(s) Oral every 6 hours  levothyroxine 75 MICROGram(s) Oral daily  oxyCODONE    IR 5 milliGRAM(s) Oral every 3 hours  prenatal multivitamin 1 Tablet(s) Oral daily  simethicone 80 milliGRAM(s) Chew four times a day    MEDICATIONS  (PRN):  diphenhydrAMINE   Capsule 25 milliGRAM(s) Oral every 6 hours PRN Itching  glycerin Suppository - Adult 1 Suppository(s) Rectal at bedtime PRN Constipation  lanolin Ointment 1 Application(s) Topical every 3 hours PRN Sore Nipples  oxyCODONE    IR 5 milliGRAM(s) Oral every 4 hours PRN Severe Pain (7 - 10)        Assessment and Plan  POD #3  s/p  section  Doing well.  Encourage ambulation.

## 2018-08-23 NOTE — PROGRESS NOTE ADULT - SUBJECTIVE AND OBJECTIVE BOX
OB Postpartum Note:  Delivery, POD#3    S: 36yo GP POD#3 s/p pLTCS. Her pain is well controlled. She is tolerating a regular diet and passing flatus. Voiding spontaneously and ambulating without difficulty. Denies N/V. Denies CP/SOB/lightheadedness/dizziness. She continues to have back pain. Says it is better that yesterday but the oxycodone is not helping her and she does not want to take it. She was switched to Motrin 600mg. She think it may be related to gas pain and will ask for more simethicone. She is getting ready for going home today.    O:   Vitals:  Vital Signs Last 24 Hrs  T(C): 36.7 (23 Aug 2018 06:12), Max: 37 (22 Aug 2018 14:25)  T(F): 98.1 (23 Aug 2018 06:12), Max: 98.6 (22 Aug 2018 14:25)  HR: 57 (23 Aug 2018 06:12) (55 - 61)  BP: 99/60 (23 Aug 2018 06:12) (96/57 - 99/60)  BP(mean): --  RR: 16 (23 Aug 2018 06:12) (16 - 18)  SpO2: 98% (23 Aug 2018 06:12) (98% - 100%)    MEDICATIONS  (STANDING):  acetaminophen   Tablet. 975 milliGRAM(s) Oral every 6 hours  ascorbic acid 500 milliGRAM(s) Oral daily  diphtheria/tetanus/pertussis (acellular) Vaccine (ADAcel) 0.5 milliLiter(s) IntraMuscular once  docusate sodium 100 milliGRAM(s) Oral two times a day  ferrous    sulfate 325 milliGRAM(s) Oral three times a day  heparin  Injectable 5000 Unit(s) SubCutaneous every 12 hours  ibuprofen  Tablet 600 milliGRAM(s) Oral every 6 hours  levothyroxine 75 MICROGram(s) Oral daily  oxyCODONE    IR 5 milliGRAM(s) Oral every 3 hours  prenatal multivitamin 1 Tablet(s) Oral daily  simethicone 80 milliGRAM(s) Chew four times a day    MEDICATIONS  (PRN):  diphenhydrAMINE   Capsule 25 milliGRAM(s) Oral every 6 hours PRN Itching  glycerin Suppository - Adult 1 Suppository(s) Rectal at bedtime PRN Constipation  lanolin Ointment 1 Application(s) Topical every 3 hours PRN Sore Nipples  oxyCODONE    IR 5 milliGRAM(s) Oral every 4 hours PRN Severe Pain (7 - 10)      Labs:  Blood type: O Positive  Rubella IgG: RPR: Negative                          11.1<L>   12.72<H> >-----------< 177    (  @ 13:15 )             35.4                        11.0<L>   11.24<H> >-----------< 176    (  @ 17:00 )             35.2                        7.0<LL>   10.94<H> >-----------< 165    (  @ 01:00 )             23.6<L>                        8.1<L>   12.08<H> >-----------< 192    (  @ 20:15 )             27.0<L>    PE:  General: NAD  Abdomen: mildly distended,appropriately tender, incision c/d/i.  Extremities: SCDs in place, no erythema    A/P: 36yo GP POD#3 s/p rLTCS.  Patient is stable and doing well post-operatively. Lactate improved and H/H stable.  - Continue regular diet.  - Increase ambulation.  - Continue motrin, tylenol, prn for pain control  - Discharge planning.    Joanna Roberto PGY-1  Pager #: [phone]

## 2020-07-28 NOTE — PATIENT PROFILE OB - AS SC BRADEN ACTIVITY
(4) walks frequently [place] Counseling:  I discussed with the patient the risks of golimumab including but not limited to myelosuppression, immunosuppression, autoimmune hepatitis, demyelinating diseases, lymphoma, and serious infections.  The patient understands that monitoring is required including a PPD at baseline and must alert us or the primary physician if symptoms of infection or other concerning signs are noted.

## 2021-11-15 NOTE — PATIENT PROFILE OB - PRO BLOOD TYPE INFANT
"GYN Visit    CC: Abnormal menses    HPI:   24 y.o. who presents with irregular menses.  The patient frequently misses menstrual cycles.  She never really knows when she is going to have a cycle.  After missing several menses if she has a cycle it is very heavy.  Is been 3 to 4 months since the patient's last menstrual cycle.  She has no other concerns today.      History: PMHx, Meds, Allergies, PSHx, Social Hx, and POBHx all reviewed and updated.    /81   Pulse 102   Ht 165.1 cm (65\")   Wt 100 kg (221 lb)   LMP  (LMP Unknown)   BMI 36.78 kg/m²     Physical Exam  Vitals and nursing note reviewed. Exam conducted with a chaperone present.   Constitutional:       General: She is not in acute distress.     Appearance: Normal appearance. She is obese. She is not ill-appearing.   Abdominal:      General: Abdomen is flat. There is no distension.      Palpations: Abdomen is soft. There is no mass.      Tenderness: There is no abdominal tenderness. There is no guarding or rebound.      Hernia: No hernia is present.   Genitourinary:     General: Normal vulva.      Exam position: Lithotomy position.      Pubic Area: No rash.       Labia:         Right: No rash, tenderness, lesion or injury.         Left: No rash, tenderness, lesion or injury.       Vagina: Normal. No signs of injury and foreign body. No vaginal discharge, erythema, tenderness, bleeding, lesions or prolapsed vaginal walls.      Uterus: Normal. Not deviated, not enlarged, not fixed, not tender and no uterine prolapse.       Adnexa: Right adnexa normal and left adnexa normal.      Comments: Pelvic exam is limited by the patient's body habitus  Musculoskeletal:         General: No swelling.      Right lower leg: No edema.      Left lower leg: No edema.   Skin:     General: Skin is warm and dry.   Neurological:      Mental Status: She is alert and oriented to person, place, and time.   Psychiatric:         Mood and Affect: Mood normal.         " Behavior: Behavior normal.         Thought Content: Thought content normal.           ASSESSMENT AND PLAN:  Diagnoses and all orders for this visit:    1. Abnormal uterine bleeding (AUB) (Primary)  Assessment & Plan:  Strongly suspect PCOS.  Plan a Provera withdrawal prior to the patient's ultrasound    Orders:  -     CBC (No Diff); Future  -     Prolactin; Future  -     TSH; Future  -     T4, Free; Future  -     hCG, Quantitative, Pregnancy; Future  -     US Pelvis Transvaginal Non OB; Future  -     medroxyPROGESTERone (Provera) 10 MG tablet; Take 0.5 tablets by mouth Daily for 5 days.  Dispense: 5 tablet; Refill: 0    2. Obesity (BMI 30-39.9)  Assessment & Plan:  Discussed diet, nutrition, exercise and weight loss and how weight loss can positively impact the patient's menstrual cycle regularity      3. Amenorrhea    4. Tobacco abuse  Assessment & Plan:  Smoking cessation counseling        Counseling: TRACK MENSES, RTO if <q21d, >7d long, heavy or painful.    SAFE SEX/condoms importance reviewed.    Weight loss/Nutrition reviewed.      Follow Up:  Return in about 3 weeks (around 12/6/2021) for after ultrasound.      Malick Sorto MD  11/15/2021       O positive
